# Patient Record
Sex: FEMALE | Race: BLACK OR AFRICAN AMERICAN | Employment: FULL TIME | URBAN - METROPOLITAN AREA
[De-identification: names, ages, dates, MRNs, and addresses within clinical notes are randomized per-mention and may not be internally consistent; named-entity substitution may affect disease eponyms.]

---

## 2022-08-29 ENCOUNTER — HOSPITAL ENCOUNTER (OUTPATIENT)
Dept: WOUND CARE | Age: 52
Discharge: HOME | End: 2022-08-29
Payer: COMMERCIAL

## 2022-08-29 PROCEDURE — 99203 OFFICE O/P NEW LOW 30 MIN: CPT | Performed by: PODIATRIST

## 2022-08-29 PROCEDURE — 99213 OFFICE O/P EST LOW 20 MIN: CPT

## 2022-08-29 PROCEDURE — 97597 DBRDMT OPN WND 1ST 20 CM/<: CPT

## 2022-08-29 PROCEDURE — 97597 DBRDMT OPN WND 1ST 20 CM/<: CPT | Performed by: PODIATRIST

## 2022-08-29 RX ORDER — CEPHALEXIN 500 MG/1
500 CAPSULE ORAL 3 TIMES DAILY
COMMUNITY
End: 2022-09-01

## 2022-08-29 NOTE — DISCHARGE INSTRUCTIONS
[facility]   Physician Orders and Discharge Instructions  [facility]  [address] [facility]  Telephone: [phone]      NAME:  Keven De Oliveira          YOB: 1970  MEDICAL RECORD NUMBER:  [id number]    Your  is:  Saul Rodríguez Care/Facility: none     Wound Location: Left Lateral Leg     Dressing orders: 1. Cleanse wound(s) with normal saline. 2. Apply dry MINO  Or equivalent to wound bed. 3. Moisten MINO with a few drops of normal saline. 4. Cover with bordered gauze  5. Change  Every other day or Monday, Wednesday, and Friday      Compression: none     Offloading Device: none     Other Instructions: Apply warm compresses to right leg daily for 10-15 minutes. Use ace wrap to hold in place. Keep all dressings clean, dry and intact. Keep pressure off the wound(s) at all times. Follow up visit   3 Weeks September 19, 2022 at     Please give 24 hour notice if unable to keep appointment. [phone]    If you experience any of the following, please call the Wound Care Service at  [phone] or go to the nearest emergency room. *Increase in pain *Temperature over 101 *Increase in drainage from your wound or a foul odor  *Uncontrolled swelling *Need for compression bandage changes due to slippage, breakthrough drainage       PLEASE NOTE: IF YOU ARE UNABLE TO OBTAIN WOUND SUPPLIES, CONTINUE TO USE THE SUPPLIES YOU HAVE AVAILABLE UNTIL YOU ARE ABLE TO REACH US.  IT IS MOST IMPORTANT TO KEEP THE WOUND COVERED AT ALL TIMES           Electronically signed by [place] Encinas DPM on 8/29/2022 at 3:16 PM

## 2022-08-29 NOTE — DISCHARGE INSTR - COC
Continuity of Care Form    Patient Name: Marco Antonio Figueroa   :  1970  MRN:  [id number]    Admit date:  2022  Discharge date:  ***    Code Status Order: No Order   Advance Directives:     Admitting Physician:  No admitting provider for patient encounter. PCP: Juan Hendrickson MD    Discharging Nurse: [facility] Unit/Room#: No information available for this encounter. Discharging Unit Phone Number: ***    Emergency Contact:   Extended Emergency Contact Information  Primary Emergency Contact: CherrieSantana   [address] Phone: [phone]  Relation: Other    Past Surgical History:  Past Surgical History:   Procedure Laterality Date     SECTION      COLON SURGERY         Immunization History: There is no immunization history on file for this patient. Active Problems: There is no problem list on file for this patient. Isolation/Infection:   Isolation            No Isolation          Patient Infection Status       None to display            Nurse Assessment:  Last Vital Signs: There were no vitals taken for this visit.     Last documented pain score (0-10 scale):    Last Weight:   Wt Readings from Last 1 Encounters:   11/06/15 238 lb 6.4 oz (108.1 kg)     Mental Status:  {IP PT MENTAL STATUS:42722}    IV Access:  { JOE IV ACCESS:321784758}    Nursing Mobility/ADLs:  Walking   {CHP DME CSQQ:197416801}  Transfer  {CHP DME MOEP:186752193}  Bathing  {CHP DME ZTRQ:518080873}  Dressing  {CHP DME IWMH:806491433}  Toileting  {P DME RGIX:578407770}  Feeding  {P DME TWKJ:292417194}  Med Admin  {P DME QFPB:803461198}  Med Delivery   { JOE MED Delivery:059819567}    Wound Care Documentation and Therapy:  Wound 22 Leg Left;Lateral #1 (Active)   Wound Image   22 1449   Wound Etiology Other 22 1449   Wound Length (cm) 1 cm 22 1449   Wound Width (cm) 0.8 cm 22 1449   Wound Depth (cm) 0.1 cm 22 1449   Wound Surface Area (cm^2) 0.8 cm^2 22 1449   Wound Volume (cm^3) 0.08 cm^3 22 1449   Wound Assessment Eschar dry 22 1449   Drainage Amount None 22 1449   Odor None 22 1449   Margins Defined edges 22 1449   Number of days: 0        Elimination:  Continence: Bowel: {YES / UZ:50931}  Bladder: {YES / IW:76561}  Urinary Catheter: {Urinary Catheter:141531295}   Colostomy/Ileostomy/Ileal Conduit: {YES / YV:29124}       Date of Last BM: ***  No intake or output data in the 24 hours ending 22 1501  No intake/output data recorded.     Safety Concerns:     [address] Safety Concerns:674589102}    Impairments/Disabilities:      [address] Impairments/Disabilities:629908842}    Nutrition Therapy:  Current Nutrition Therapy:   [address] Diet List:108245959}    Routes of Feeding: {[facility] DME Other Feedings:096749684}  Liquids: {Slp liquid thickness:87547}  Daily Fluid Restriction: {CHP DME Yes amt example:816923054}  Last Modified Barium Swallow with Video (Video Swallowing Test): {Done Not Done QMYS:181043805}    Treatments at the Time of Hospital Discharge:   Respiratory Treatments: ***  Oxygen Therapy:  {Therapy; copd oxygen:85291}  Ventilator:    { CC Vent VCQW:949577436}    Rehab Therapies: {THERAPEUTIC INTERVENTION:8410084757}  Weight Bearing Status/Restrictions: [address]  Weight Bearin}  Other Medical Equipment (for information only, NOT a DME order):  {EQUIPMENT:889015254}  Other Treatments: ***    Patient's personal belongings (please select all that are sent with patient):  {[facility] DME Belongings:261583699}    RN SIGNATURE:  {Esignature:350385228}    CASE MANAGEMENT/SOCIAL WORK SECTION    Inpatient Status Date: ***    Readmission Risk Assessment Score:  Readmission Risk              Risk of Unplanned Readmission:  0           Discharging to Facility/ Agency   Name:   Address:  Phone:  Fax:    Dialysis Facility (if applicable)   Name:  Address:  Dialysis Schedule:  Phone:  Fax:    / signature: {Esignature:689943468}    PHYSICIAN SECTION    Prognosis: {Prognosis:1274799994}    Condition at Discharge: Natalie8 Laura Tristan Patient Condition:849845303}    Rehab Potential (if transferring to Rehab): {Prognosis:9630517039}    Recommended Labs or Other Treatments After Discharge: ***    Physician Certification: I certify the above information and transfer of Ena Killian  is necessary for the continuing treatment of the diagnosis listed and that she requires {Admit to Appropriate Level of Care:62315} for {GREATER/LESS:888108955} 30 days.      Update Admission H&P: {CHP DME Changes in UGDNP:382741011}    PHYSICIAN SIGNATURE:  {Esignature:956882243}
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
## 2022-08-29 NOTE — PLAN OF CARE
Problem: Cognitive:  Goal: Knowledge of wound care  Description: Knowledge of wound care  Outcome: Progressing  Goal: Understands risk factors for wounds  Description: Understands risk factors for wounds  Outcome: Progressing     Problem: Wound:  Goal: Will show signs of wound healing; wound closure and no evidence of infection  Description: Will show signs of wound healing; wound closure and no evidence of infection  Outcome: Progressing     Problem: Smoking cessation:  Goal: Ability to formulate a plan to maintain a tobacco-free life will be supported  Description: Ability to formulate a plan to maintain a tobacco-free life will be supported  Outcome: Progressing     Problem: Compression therapy:  Goal: Will be free from complications associated with compression therapy  Description: Will be free from complications associated with compression therapy  Outcome: Progressing     Problem: Weight control:  Goal: Ability to maintain an optimal weight for height and age will be supported  Description: Ability to maintain an optimal weight for height and age will be supported  Outcome: Progressing

## 2022-11-02 ENCOUNTER — TELEPHONE (OUTPATIENT)
Dept: WOUND CARE | Age: 52
End: 2022-11-02

## 2022-11-02 NOTE — TELEPHONE ENCOUNTER
Spoke with patient who states her wound is healed, will be discharged from [facility].

## 2023-09-18 DIAGNOSIS — R19.00 MASS OF PELVIS: ICD-10-CM

## 2023-09-18 DIAGNOSIS — C18.7 MALIGNANT NEOPLASM OF SIGMOID COLON (MULTI): Primary | ICD-10-CM

## 2023-09-26 DIAGNOSIS — R19.00 PELVIC MASS IN FEMALE: ICD-10-CM

## 2023-09-26 DIAGNOSIS — C18.7 MALIGNANT NEOPLASM OF SIGMOID COLON (MULTI): Primary | ICD-10-CM

## 2023-11-06 ENCOUNTER — APPOINTMENT (OUTPATIENT)
Dept: GYNECOLOGIC ONCOLOGY | Facility: CLINIC | Age: 53
End: 2023-11-06
Payer: COMMERCIAL

## 2023-11-10 PROBLEM — L98.9 LEG SKIN LESION, LEFT: Status: ACTIVE | Noted: 2023-11-10

## 2023-11-10 PROBLEM — F41.9 ANXIETY DISORDER: Status: ACTIVE | Noted: 2023-11-10

## 2023-11-10 PROBLEM — R94.31 ABNORMAL EKG: Status: ACTIVE | Noted: 2023-11-10

## 2023-11-10 PROBLEM — R74.8 ELEVATED LIVER ENZYMES: Status: ACTIVE | Noted: 2023-11-10

## 2023-11-10 PROBLEM — R00.0 TACHYCARDIA: Status: ACTIVE | Noted: 2023-11-10

## 2023-11-10 PROBLEM — R45.86 LABILE MOOD: Status: ACTIVE | Noted: 2023-11-10

## 2023-11-10 PROBLEM — R93.5 ABNORMAL CT OF THE ABDOMEN: Status: ACTIVE | Noted: 2023-11-10

## 2023-11-10 PROBLEM — C18.7 MALIGNANT NEOPLASM OF SIGMOID COLON (MULTI): Status: ACTIVE | Noted: 2023-11-10

## 2023-11-10 PROBLEM — I10 ESSENTIAL HYPERTENSION, BENIGN: Status: ACTIVE | Noted: 2023-11-10

## 2023-11-10 PROBLEM — R73.9 HYPERGLYCEMIA: Status: ACTIVE | Noted: 2023-11-10

## 2023-11-10 RX ORDER — CEPHALEXIN 500 MG/1
500 CAPSULE ORAL 3 TIMES DAILY
COMMUNITY
Start: 2022-08-13

## 2023-11-10 RX ORDER — CARVEDILOL 12.5 MG/1
1 TABLET ORAL 2 TIMES DAILY
COMMUNITY
Start: 2022-04-02

## 2023-11-10 RX ORDER — SERTRALINE HYDROCHLORIDE 50 MG/1
50 TABLET, FILM COATED ORAL DAILY
COMMUNITY
End: 2023-12-12

## 2023-11-10 RX ORDER — SEMAGLUTIDE 1.34 MG/ML
0.25 INJECTION, SOLUTION SUBCUTANEOUS
COMMUNITY
Start: 2022-08-13

## 2023-12-12 DIAGNOSIS — R45.86 EMOTIONAL LABILITY: ICD-10-CM

## 2023-12-12 RX ORDER — SERTRALINE HYDROCHLORIDE 50 MG/1
50 TABLET, FILM COATED ORAL DAILY
Qty: 90 TABLET | Refills: 1 | Status: SHIPPED | OUTPATIENT
Start: 2023-12-12

## 2024-09-24 ENCOUNTER — LAB (OUTPATIENT)
Dept: LAB | Facility: CLINIC | Age: 54
End: 2024-09-24
Payer: COMMERCIAL

## 2024-09-24 ENCOUNTER — OFFICE VISIT (OUTPATIENT)
Dept: HEMATOLOGY/ONCOLOGY | Facility: CLINIC | Age: 54
End: 2024-09-24
Payer: COMMERCIAL

## 2024-09-24 ENCOUNTER — TELEPHONE (OUTPATIENT)
Dept: GASTROENTEROLOGY | Facility: CLINIC | Age: 54
End: 2024-09-24
Payer: COMMERCIAL

## 2024-09-24 VITALS
WEIGHT: 236.33 LBS | BODY MASS INDEX: 40.5 KG/M2 | HEART RATE: 93 BPM | OXYGEN SATURATION: 98 % | SYSTOLIC BLOOD PRESSURE: 170 MMHG | DIASTOLIC BLOOD PRESSURE: 101 MMHG | TEMPERATURE: 96.8 F | RESPIRATION RATE: 16 BRPM

## 2024-09-24 DIAGNOSIS — F41.9 ANXIETY DISORDER, UNSPECIFIED TYPE: ICD-10-CM

## 2024-09-24 DIAGNOSIS — E66.01 CLASS 3 SEVERE OBESITY WITH BODY MASS INDEX (BMI) OF 40.0 TO 44.9 IN ADULT, UNSPECIFIED OBESITY TYPE, UNSPECIFIED WHETHER SERIOUS COMORBIDITY PRESENT: ICD-10-CM

## 2024-09-24 DIAGNOSIS — C18.7 MALIGNANT NEOPLASM OF SIGMOID COLON (MULTI): Primary | ICD-10-CM

## 2024-09-24 DIAGNOSIS — C18.7 MALIGNANT NEOPLASM OF SIGMOID COLON (MULTI): ICD-10-CM

## 2024-09-24 DIAGNOSIS — I10 ESSENTIAL HYPERTENSION, BENIGN: ICD-10-CM

## 2024-09-24 LAB
ALBUMIN SERPL BCP-MCNC: 4.1 G/DL (ref 3.4–5)
ALP SERPL-CCNC: 113 U/L (ref 33–110)
ALT SERPL W P-5'-P-CCNC: 13 U/L (ref 7–45)
ANION GAP SERPL CALC-SCNC: 13 MMOL/L (ref 10–20)
AST SERPL W P-5'-P-CCNC: 19 U/L (ref 9–39)
BASOPHILS # BLD AUTO: 0.06 X10*3/UL (ref 0–0.1)
BASOPHILS NFR BLD AUTO: 1 %
BILIRUB SERPL-MCNC: 0.5 MG/DL (ref 0–1.2)
BUN SERPL-MCNC: 20 MG/DL (ref 6–23)
CALCIUM SERPL-MCNC: 9.7 MG/DL (ref 8.6–10.3)
CEA SERPL-MCNC: 0.6 UG/L
CHLORIDE SERPL-SCNC: 102 MMOL/L (ref 98–107)
CO2 SERPL-SCNC: 28 MMOL/L (ref 21–32)
CREAT SERPL-MCNC: 0.73 MG/DL (ref 0.5–1.05)
EGFRCR SERPLBLD CKD-EPI 2021: >90 ML/MIN/1.73M*2
EOSINOPHIL # BLD AUTO: 0.17 X10*3/UL (ref 0–0.7)
EOSINOPHIL NFR BLD AUTO: 2.7 %
ERYTHROCYTE [DISTWIDTH] IN BLOOD BY AUTOMATED COUNT: 14.6 % (ref 11.5–14.5)
GLUCOSE SERPL-MCNC: 102 MG/DL (ref 74–99)
HCT VFR BLD AUTO: 44.1 % (ref 36–46)
HGB BLD-MCNC: 14 G/DL (ref 12–16)
IMM GRANULOCYTES # BLD AUTO: 0.01 X10*3/UL (ref 0–0.7)
IMM GRANULOCYTES NFR BLD AUTO: 0.2 % (ref 0–0.9)
LYMPHOCYTES # BLD AUTO: 1.85 X10*3/UL (ref 1.2–4.8)
LYMPHOCYTES NFR BLD AUTO: 29.6 %
MCH RBC QN AUTO: 29.4 PG (ref 26–34)
MCHC RBC AUTO-ENTMCNC: 31.7 G/DL (ref 32–36)
MCV RBC AUTO: 93 FL (ref 80–100)
MONOCYTES # BLD AUTO: 0.61 X10*3/UL (ref 0.1–1)
MONOCYTES NFR BLD AUTO: 9.7 %
NEUTROPHILS # BLD AUTO: 3.56 X10*3/UL (ref 1.2–7.7)
NEUTROPHILS NFR BLD AUTO: 56.8 %
PLATELET # BLD AUTO: 320 X10*3/UL (ref 150–450)
POTASSIUM SERPL-SCNC: 4 MMOL/L (ref 3.5–5.3)
PROT SERPL-MCNC: 8.3 G/DL (ref 6.4–8.2)
RBC # BLD AUTO: 4.77 X10*6/UL (ref 4–5.2)
SODIUM SERPL-SCNC: 139 MMOL/L (ref 136–145)
WBC # BLD AUTO: 6.3 X10*3/UL (ref 4.4–11.3)

## 2024-09-24 PROCEDURE — 80053 COMPREHEN METABOLIC PANEL: CPT

## 2024-09-24 PROCEDURE — 3077F SYST BP >= 140 MM HG: CPT | Performed by: INTERNAL MEDICINE

## 2024-09-24 PROCEDURE — 85025 COMPLETE CBC W/AUTO DIFF WBC: CPT

## 2024-09-24 PROCEDURE — 99214 OFFICE O/P EST MOD 30 MIN: CPT | Performed by: INTERNAL MEDICINE

## 2024-09-24 PROCEDURE — 36415 COLL VENOUS BLD VENIPUNCTURE: CPT

## 2024-09-24 PROCEDURE — 3080F DIAST BP >= 90 MM HG: CPT | Performed by: INTERNAL MEDICINE

## 2024-09-24 PROCEDURE — 82378 CARCINOEMBRYONIC ANTIGEN: CPT

## 2024-09-24 RX ORDER — CARVEDILOL 12.5 MG/1
12.5 TABLET ORAL 2 TIMES DAILY
Qty: 60 TABLET | Refills: 0 | Status: SHIPPED | OUTPATIENT
Start: 2024-09-24

## 2024-09-24 RX ORDER — POLYETHYLENE GLYCOL 3350, SODIUM CHLORIDE, SODIUM BICARBONATE, POTASSIUM CHLORIDE 420; 11.2; 5.72; 1.48 G/4L; G/4L; G/4L; G/4L
4000 POWDER, FOR SOLUTION ORAL ONCE
Qty: 4000 ML | Refills: 0 | Status: SHIPPED | OUTPATIENT
Start: 2024-09-24 | End: 2024-09-24

## 2024-09-24 ASSESSMENT — PAIN SCALES - GENERAL: PAINLEVEL: 0-NO PAIN

## 2024-09-24 NOTE — PROGRESS NOTES
Had CRC in 2020, due for surveillance colonoscopy.  Order placed, please call to schedule.   Kal De Paz MD

## 2024-09-24 NOTE — PATIENT INSTRUCTIONS
You need to followup with your PCP as soon as possible regarding your blood pressure    See you again in 1 year    You are also way overdue for colonoscopy, which was supposed to have been done at the end of 2021

## 2024-09-28 PROBLEM — E66.813 CLASS 3 SEVERE OBESITY WITH BODY MASS INDEX (BMI) OF 40.0 TO 44.9 IN ADULT: Status: ACTIVE | Noted: 2024-09-28

## 2024-09-28 PROBLEM — E66.01 CLASS 3 SEVERE OBESITY WITH BODY MASS INDEX (BMI) OF 40.0 TO 44.9 IN ADULT: Status: ACTIVE | Noted: 2024-09-28

## 2024-09-28 ASSESSMENT — ENCOUNTER SYMPTOMS
CARDIOVASCULAR NEGATIVE: 1
MUSCULOSKELETAL NEGATIVE: 1
CONSTITUTIONAL NEGATIVE: 1
GASTROINTESTINAL NEGATIVE: 1
EYES NEGATIVE: 1
RESPIRATORY NEGATIVE: 1
ENDOCRINE NEGATIVE: 1

## 2024-10-16 ENCOUNTER — LAB (OUTPATIENT)
Dept: LAB | Facility: LAB | Age: 54
End: 2024-10-16
Payer: COMMERCIAL

## 2024-10-16 ENCOUNTER — APPOINTMENT (OUTPATIENT)
Dept: PRIMARY CARE | Facility: CLINIC | Age: 54
End: 2024-10-16
Payer: COMMERCIAL

## 2024-10-16 VITALS
SYSTOLIC BLOOD PRESSURE: 156 MMHG | RESPIRATION RATE: 16 BRPM | HEIGHT: 64 IN | HEART RATE: 74 BPM | DIASTOLIC BLOOD PRESSURE: 94 MMHG | WEIGHT: 239 LBS | OXYGEN SATURATION: 97 % | BODY MASS INDEX: 40.8 KG/M2 | TEMPERATURE: 97.5 F

## 2024-10-16 DIAGNOSIS — R73.9 HYPERGLYCEMIA: ICD-10-CM

## 2024-10-16 DIAGNOSIS — R06.83 SNORING: ICD-10-CM

## 2024-10-16 DIAGNOSIS — R45.86 EMOTIONAL LABILITY: ICD-10-CM

## 2024-10-16 DIAGNOSIS — F41.9 ANXIETY DISORDER, UNSPECIFIED TYPE: ICD-10-CM

## 2024-10-16 DIAGNOSIS — F51.01 PRIMARY INSOMNIA: ICD-10-CM

## 2024-10-16 DIAGNOSIS — C18.7 MALIGNANT NEOPLASM OF SIGMOID COLON (MULTI): ICD-10-CM

## 2024-10-16 DIAGNOSIS — Z00.00 PHYSICAL EXAM: Primary | ICD-10-CM

## 2024-10-16 DIAGNOSIS — I10 ESSENTIAL HYPERTENSION, BENIGN: ICD-10-CM

## 2024-10-16 DIAGNOSIS — Z12.31 ENCOUNTER FOR SCREENING MAMMOGRAM FOR MALIGNANT NEOPLASM OF BREAST: ICD-10-CM

## 2024-10-16 LAB
CHOLEST SERPL-MCNC: 239 MG/DL (ref 0–199)
CHOLESTEROL/HDL RATIO: 3.5
EST. AVERAGE GLUCOSE BLD GHB EST-MCNC: 105 MG/DL
HBA1C MFR BLD: 5.3 %
HDLC SERPL-MCNC: 67.6 MG/DL
LDLC SERPL CALC-MCNC: 152 MG/DL
NON HDL CHOLESTEROL: 171 MG/DL (ref 0–149)
T4 FREE SERPL-MCNC: 1.02 NG/DL (ref 0.61–1.12)
TRIGL SERPL-MCNC: 96 MG/DL (ref 0–149)
TSH SERPL-ACNC: 1.21 MIU/L (ref 0.44–3.98)
VLDL: 19 MG/DL (ref 0–40)

## 2024-10-16 PROCEDURE — 99214 OFFICE O/P EST MOD 30 MIN: CPT | Performed by: FAMILY MEDICINE

## 2024-10-16 PROCEDURE — 3008F BODY MASS INDEX DOCD: CPT | Performed by: FAMILY MEDICINE

## 2024-10-16 PROCEDURE — 3080F DIAST BP >= 90 MM HG: CPT | Performed by: FAMILY MEDICINE

## 2024-10-16 PROCEDURE — 80061 LIPID PANEL: CPT

## 2024-10-16 PROCEDURE — 84439 ASSAY OF FREE THYROXINE: CPT

## 2024-10-16 PROCEDURE — 84443 ASSAY THYROID STIM HORMONE: CPT

## 2024-10-16 PROCEDURE — 4004F PT TOBACCO SCREEN RCVD TLK: CPT | Performed by: FAMILY MEDICINE

## 2024-10-16 PROCEDURE — 3077F SYST BP >= 140 MM HG: CPT | Performed by: FAMILY MEDICINE

## 2024-10-16 PROCEDURE — 36415 COLL VENOUS BLD VENIPUNCTURE: CPT

## 2024-10-16 PROCEDURE — 83036 HEMOGLOBIN GLYCOSYLATED A1C: CPT

## 2024-10-16 RX ORDER — AMLODIPINE BESYLATE 2.5 MG/1
2.5 TABLET ORAL DAILY
Qty: 90 TABLET | Refills: 3 | Status: SHIPPED | OUTPATIENT
Start: 2024-10-16 | End: 2025-04-14

## 2024-10-16 RX ORDER — ZOLPIDEM TARTRATE 5 MG/1
5 TABLET ORAL NIGHTLY PRN
Qty: 28 TABLET | Refills: 0 | Status: SHIPPED | OUTPATIENT
Start: 2024-10-16 | End: 2024-12-15

## 2024-10-16 RX ORDER — SERTRALINE HYDROCHLORIDE 50 MG/1
50 TABLET, FILM COATED ORAL DAILY
Qty: 90 TABLET | Refills: 3 | Status: SHIPPED | OUTPATIENT
Start: 2024-10-16

## 2024-10-16 ASSESSMENT — PATIENT HEALTH QUESTIONNAIRE - PHQ9
7. TROUBLE CONCENTRATING ON THINGS, SUCH AS READING THE NEWSPAPER OR WATCHING TELEVISION: SEVERAL DAYS
2. FEELING DOWN, DEPRESSED OR HOPELESS: NEARLY EVERY DAY
SUM OF ALL RESPONSES TO PHQ9 QUESTIONS 1 AND 2: 6
SUM OF ALL RESPONSES TO PHQ QUESTIONS 1-9: 18
3. TROUBLE FALLING OR STAYING ASLEEP OR SLEEPING TOO MUCH: NEARLY EVERY DAY
6. FEELING BAD ABOUT YOURSELF - OR THAT YOU ARE A FAILURE OR HAVE LET YOURSELF OR YOUR FAMILY DOWN: MORE THAN HALF THE DAYS
8. MOVING OR SPEAKING SO SLOWLY THAT OTHER PEOPLE COULD HAVE NOTICED. OR THE OPPOSITE, BEING SO FIGETY OR RESTLESS THAT YOU HAVE BEEN MOVING AROUND A LOT MORE THAN USUAL: SEVERAL DAYS
5. POOR APPETITE OR OVEREATING: SEVERAL DAYS
4. FEELING TIRED OR HAVING LITTLE ENERGY: NEARLY EVERY DAY
9. THOUGHTS THAT YOU WOULD BE BETTER OFF DEAD, OR OF HURTING YOURSELF: SEVERAL DAYS
10. IF YOU CHECKED OFF ANY PROBLEMS, HOW DIFFICULT HAVE THESE PROBLEMS MADE IT FOR YOU TO DO YOUR WORK, TAKE CARE OF THINGS AT HOME, OR GET ALONG WITH OTHER PEOPLE: VERY DIFFICULT
1. LITTLE INTEREST OR PLEASURE IN DOING THINGS: NEARLY EVERY DAY

## 2024-10-16 NOTE — PROGRESS NOTES
Covid vax: declined  Flu: declined  Shingles: declined    CRC: 2020-[place]  Mammogram: 8/2021-ordered  Pap: 2020  Lmp: n/a

## 2024-10-16 NOTE — PROGRESS NOTES
"Subjective   Patient ID: Shruti Zurita is a 54 y.o. female who presents for Hypertension (Was off coreg x >6 months/Restarted 3 weeks ago), Anxiety, and Depression (Has been very depressed lately).  Covid vax: declined  Flu: declined  Shingles: declined     CRC: -CA  Mammogram: 2021-ordered  Pap:   Lmp: n/a  HPI  Patient Active Problem List   Diagnosis    Abnormal CT of the abdomen    Abnormal EKG    Anxiety disorder    Elevated liver enzymes    Essential hypertension, benign    Hyperglycemia    Labile mood    Leg skin lesion, left    Malignant neoplasm of sigmoid colon (Multi)    Tachycardia    Class 3 severe obesity with body mass index (BMI) of 40.0 to 44.9 in adult       Past Surgical History:   Procedure Laterality Date     SECTION, CLASSIC      ,     COLON SURGERY      [place]    COLONOSCOPY W/ POLYPECTOMY      INVASIVE, MODERATELY DIFFERENTIATED ADENOCARCINOMA (sigmoid)-repeat     FLEXIBLE SIGMOIDOSCOPY  2021    wnl     Has scope planned     Review of Systems  This patient has  NO history of seizures/ CAD or CVA    NO history of recent Covid nor flu symptoms,  NO Fever nor chills,  NO Chest pain, shortness of breath nor paroxysmal nocturnal dyspnea,  NO Nausea, vomiting, nor diarrhea,  NO Hematochezia nor melena,  NO Dysuria, hematuria, nor new incontinence issues  NO new severe headaches nor neurological complaints,  NO new issues with anxiety nor depression nor new psychiatric complaints,  NO suicidal nor homicidal ideations.   Has not reliably followed with heme onc or GI  Is AWARE needs to  Also had been offered GYN for abn pelvic ct    OBJECTIVE:  BP (!) 156/94   Pulse 74   Temp 36.4 °C (97.5 °F) (Temporal)   Resp 16   Ht 1.626 m (5' 4\")   Wt 108 kg (239 lb)   SpO2 97%   BMI 41.02 kg/m²      General:  alert, oriented, no acute distress.  No obvious skin rashes noted.   No gait disturbance noted.    Mood is pleasant,  no signs of emotional distress.   Not " appearing intoxicated or altered.   No voiced delusions,   Normal, appropriate behavior.    HEENT: Normocephalic, atraumatic,   Pupils round, reactive to light  Extraocular motions intact and wnl  Tympanic membranes normal    Neck: no nuchal rigidity  No masses palpable.  No carotid bruits.  No thyromegaly.    Respiratory: Equal breath sounds  No wheezes,    rales,    nor rhonchi  No respiratory distress.    Heart: Regular rate and rhythm, no    murmurs  no rubs/gallops    Abdomen: no masses palpable, nontender, no rebound nor guarding.  Ovwt    Extremities: NO cyanosis noted, no clubbing.   No edema noted.  2+dorsalis pedis pulses.    Normal-not antalgic, steady gait.    Lab on 09/24/2024   Component Date Value Ref Range Status    WBC 09/24/2024 6.3  4.4 - 11.3 x10*3/uL Final    RBC 09/24/2024 4.77  4.00 - 5.20 x10*6/uL Final    Hemoglobin 09/24/2024 14.0  12.0 - 16.0 g/dL Final    Hematocrit 09/24/2024 44.1  36.0 - 46.0 % Final    MCV 09/24/2024 93  80 - 100 fL Final    MCH 09/24/2024 29.4  26.0 - 34.0 pg Final    MCHC 09/24/2024 31.7 (L)  32.0 - 36.0 g/dL Final    RDW 09/24/2024 14.6 (H)  11.5 - 14.5 % Final    Platelets 09/24/2024 320  150 - 450 x10*3/uL Final    Neutrophils % 09/24/2024 56.8  40.0 - 80.0 % Final    Immature Granulocytes %, Automated 09/24/2024 0.2  0.0 - 0.9 % Final    Immature Granulocyte Count (IG) includes promyelocytes, myelocytes and metamyelocytes but does not include bands. Percent differential counts (%) should be interpreted in the context of the absolute cell counts (cells/UL).    Lymphocytes % 09/24/2024 29.6  13.0 - 44.0 % Final    Monocytes % 09/24/2024 9.7  2.0 - 10.0 % Final    Eosinophils % 09/24/2024 2.7  0.0 - 6.0 % Final    Basophils % 09/24/2024 1.0  0.0 - 2.0 % Final    Neutrophils Absolute 09/24/2024 3.56  1.20 - 7.70 x10*3/uL Final    Percent differential counts (%) should be interpreted in the context of the absolute cell counts (cells/uL).    Immature Granulocytes  Absolute, Au* 09/24/2024 0.01  0.00 - 0.70 x10*3/uL Final    Lymphocytes Absolute 09/24/2024 1.85  1.20 - 4.80 x10*3/uL Final    Monocytes Absolute 09/24/2024 0.61  0.10 - 1.00 x10*3/uL Final    Eosinophils Absolute 09/24/2024 0.17  0.00 - 0.70 x10*3/uL Final    Basophils Absolute 09/24/2024 0.06  0.00 - 0.10 x10*3/uL Final    Carcinoembryonic AG 09/24/2024 0.6  ug/L Final    Glucose 09/24/2024 102 (H)  74 - 99 mg/dL Final    Sodium 09/24/2024 139  136 - 145 mmol/L Final    Potassium 09/24/2024 4.0  3.5 - 5.3 mmol/L Final    Chloride 09/24/2024 102  98 - 107 mmol/L Final    Bicarbonate 09/24/2024 28  21 - 32 mmol/L Final    Anion Gap 09/24/2024 13  10 - 20 mmol/L Final    Urea Nitrogen 09/24/2024 20  6 - 23 mg/dL Final    Creatinine 09/24/2024 0.73  0.50 - 1.05 mg/dL Final    eGFR 09/24/2024 >90  >60 mL/min/1.73m*2 Final    Calculations of estimated GFR are performed using the 2021 CKD-EPI Study Refit equation without the race variable for the IDMS-Traceable creatinine methods.  https://jasn.asnjournals.org/content/early/2021/09/22/ASN.9787442029    Calcium 09/24/2024 9.7  8.6 - 10.3 mg/dL Final    Albumin 09/24/2024 4.1  3.4 - 5.0 g/dL Final    Alkaline Phosphatase 09/24/2024 113 (H)  33 - 110 U/L Final    Total Protein 09/24/2024 8.3 (H)  6.4 - 8.2 g/dL Final    AST 09/24/2024 19  9 - 39 U/L Final    Bilirubin, Total 09/24/2024 0.5  0.0 - 1.2 mg/dL Final    ALT 09/24/2024 13  7 - 45 U/L Final    Patients treated with Sulfasalazine may generate falsely decreased results for ALT.        Assessment/Plan     Problem List Items Addressed This Visit       Anxiety disorder    Relevant Orders    Thyroid Stimulating Hormone    Thyroxine, Free    Lipid Panel    Hemoglobin A1C    Essential hypertension, benign    Relevant Medications    amLODIPine (Norvasc) 2.5 mg tablet    Other Relevant Orders    Thyroid Stimulating Hormone    Thyroxine, Free    Lipid Panel    Hemoglobin A1C    Hyperglycemia    Relevant Orders    Thyroid  Stimulating Hormone    Thyroxine, Free    Lipid Panel    Hemoglobin A1C    Malignant neoplasm of sigmoid colon (Multi)    Relevant Orders    Thyroid Stimulating Hormone    Thyroxine, Free    Lipid Panel    Hemoglobin A1C     Other Visit Diagnoses       Physical exam    -  Primary    Relevant Orders    CT cardiac scoring wo IV contrast    Encounter for screening mammogram for malignant neoplasm of breast        Relevant Orders    BI mammo bilateral screening tomosynthesis    Thyroid Stimulating Hormone    Thyroxine, Free    Lipid Panel    Hemoglobin A1C    Emotional lability        Relevant Medications    sertraline (Zoloft) 50 mg tablet    Primary insomnia        Relevant Medications    zolpidem (Ambien) 5 mg tablet            Follow up at next scheduled visit -as planned  Add amlodipine 2.5 This medications risks, benefits, and alternatives were discussed with patient at length.  If any unwanted side effects occur-discontinue medicine and call the office for discussion.  Some insomnia  And stress  Add ambien for a few wks  This medications risks, benefits, and alternatives were discussed with patient at length.  If any unwanted side effects occur-discontinue medicine and call the office for discussion.  Does report snoring unclear if any an=pnea  Risks of ambien addressed-zara if apnea  This medications risks, benefits, and alternatives were discussed with patient at length.  If any unwanted side effects occur-discontinue medicine and call the office for discussion.  Resume zoloft  No hi/si  Has counselor at work  Is grieving siblings loss    Overuse and abuse potential discussed with patient (parents if applicable)  If mood deterioration, status change etc on medicine, suicidal or homicidal ideations -patient agrees to proceed with crisis plan-in place-including-not limited to contacting family, our office and or proceeding to ER.  Reviewed controlled substance agreement - including but not limited to the  risks-benefits-alternatives to treatment with a controlled substance medication(s).  It is recommended that [place]ARRS reports be checked and patient have appointment at least every 3months(6 for certain medicines only)  If the agreement signed (controlled substance agreement) is violated prescriptions may be stopped abruptly and patient /family understands and agrees to this.  Another reason for termination of agreement is if we have concern for abuse or overuse and it is also recommended that patient take responsibility to try to taper and minimize use of these medicines frequently trying to limit or gradually taper to discontinuation.    Patient is aware that side effects such as insomnia, unexpected weight changes are unexpected and should result in discontinuation.  Always use caution and AVOID operating machinery(driving etc) on pain medicines or CNS depressants and avoid combining together OR with alcohol. If opioids are prescribed patient understands the benefits of narcan and was offered prescription.  Follow up sooner if condition deteriorates or problems arise.  Reviewed controlled substances agreement including but not limited to the benefits-risks and alternatives to treatment with a controlled substance medication.    Agrees to regular follow and counseling for maximum benefits.  Cut down on etoh  No etoh w ambien or driving etc    6-8wks bp check  Ct cardiac sibling had CAD and  recently

## 2024-10-25 ENCOUNTER — HOSPITAL ENCOUNTER (OUTPATIENT)
Dept: RADIOLOGY | Facility: HOSPITAL | Age: 54
Discharge: HOME | End: 2024-10-25
Payer: COMMERCIAL

## 2024-10-25 DIAGNOSIS — Z12.31 ENCOUNTER FOR SCREENING MAMMOGRAM FOR MALIGNANT NEOPLASM OF BREAST: ICD-10-CM

## 2024-10-25 PROCEDURE — 77067 SCR MAMMO BI INCL CAD: CPT

## 2024-11-01 DIAGNOSIS — R92.8 ABNORMAL MAMMOGRAM: ICD-10-CM

## 2024-11-06 ENCOUNTER — ANESTHESIA EVENT (OUTPATIENT)
Dept: GASTROENTEROLOGY | Facility: EXTERNAL LOCATION | Age: 54
End: 2024-11-06

## 2024-11-15 ENCOUNTER — APPOINTMENT (OUTPATIENT)
Dept: GASTROENTEROLOGY | Facility: EXTERNAL LOCATION | Age: 54
End: 2024-11-15
Payer: COMMERCIAL

## 2024-11-15 ENCOUNTER — ANESTHESIA (OUTPATIENT)
Dept: GASTROENTEROLOGY | Facility: EXTERNAL LOCATION | Age: 54
End: 2024-11-15

## 2024-11-15 VITALS
BODY MASS INDEX: 40.8 KG/M2 | TEMPERATURE: 97.9 F | HEIGHT: 64 IN | DIASTOLIC BLOOD PRESSURE: 80 MMHG | WEIGHT: 239 LBS | OXYGEN SATURATION: 98 % | HEART RATE: 92 BPM | SYSTOLIC BLOOD PRESSURE: 126 MMHG | RESPIRATION RATE: 21 BRPM

## 2024-11-15 DIAGNOSIS — C18.7 MALIGNANT NEOPLASM OF SIGMOID COLON (MULTI): ICD-10-CM

## 2024-11-15 PROCEDURE — 45385 COLONOSCOPY W/LESION REMOVAL: CPT | Performed by: INTERNAL MEDICINE

## 2024-11-15 RX ORDER — ONDANSETRON HYDROCHLORIDE 2 MG/ML
4 INJECTION, SOLUTION INTRAVENOUS ONCE AS NEEDED
Status: DISCONTINUED | OUTPATIENT
Start: 2024-11-15 | End: 2024-11-16 | Stop reason: HOSPADM

## 2024-11-15 RX ORDER — PROPOFOL 10 MG/ML
INJECTION, EMULSION INTRAVENOUS AS NEEDED
Status: DISCONTINUED | OUTPATIENT
Start: 2024-11-15 | End: 2024-11-15

## 2024-11-15 RX ORDER — LIDOCAINE HYDROCHLORIDE 20 MG/ML
INJECTION, SOLUTION INFILTRATION; PERINEURAL AS NEEDED
Status: DISCONTINUED | OUTPATIENT
Start: 2024-11-15 | End: 2024-11-15

## 2024-11-15 RX ORDER — SODIUM CHLORIDE 9 MG/ML
INJECTION, SOLUTION INTRAVENOUS CONTINUOUS PRN
Status: DISCONTINUED | OUTPATIENT
Start: 2024-11-15 | End: 2024-11-15

## 2024-11-15 SDOH — HEALTH STABILITY: MENTAL HEALTH: CURRENT SMOKER: 0

## 2024-11-15 ASSESSMENT — PAIN - FUNCTIONAL ASSESSMENT
PAIN_FUNCTIONAL_ASSESSMENT: 0-10

## 2024-11-15 ASSESSMENT — PAIN SCALES - GENERAL
PAINLEVEL_OUTOF10: 0 - NO PAIN
PAINLEVEL_OUTOF10: 0 - NO PAIN
PAIN_LEVEL: 0
PAINLEVEL_OUTOF10: 0 - NO PAIN
PAINLEVEL_OUTOF10: 0 - NO PAIN

## 2024-11-15 ASSESSMENT — COLUMBIA-SUICIDE SEVERITY RATING SCALE - C-SSRS
1. IN THE PAST MONTH, HAVE YOU WISHED YOU WERE DEAD OR WISHED YOU COULD GO TO SLEEP AND NOT WAKE UP?: NO
6. HAVE YOU EVER DONE ANYTHING, STARTED TO DO ANYTHING, OR PREPARED TO DO ANYTHING TO END YOUR LIFE?: NO
2. HAVE YOU ACTUALLY HAD ANY THOUGHTS OF KILLING YOURSELF?: NO

## 2024-11-15 NOTE — DISCHARGE INSTRUCTIONS

## 2024-11-15 NOTE — ANESTHESIA PREPROCEDURE EVALUATION
Patient: Shruti Zurita    Procedure Information       Date/Time: 11/15/24 0830    Scheduled providers: Kal De Paz MD; Denisse Poon RN    Procedure: COLONOSCOPY    Location: [facility]            Relevant Problems   Cardiac   (+) Abnormal EKG   (+) Essential hypertension, benign      Neuro   (+) Anxiety disorder      GI   (+) Malignant neoplasm of sigmoid colon (Multi)      Liver   (+) Malignant neoplasm of sigmoid colon (Multi)      Endocrine   (+) Class 3 severe obesity with body mass index (BMI) of 40.0 to 44.9 in adult       Clinical information reviewed:                   NPO Detail:  No data recorded     Physical Exam    Airway  Mallampati: II  TM distance: >3 FB  Neck ROM: full     Cardiovascular - normal exam     Dental - normal exam     Pulmonary - normal exam  Breath sounds clear to auscultation     Abdominal            Anesthesia Plan    History of general anesthesia?: yes  History of complications of general anesthesia?: no    ASA 3     MAC     The patient is not a current smoker.    intravenous induction   Anesthetic plan and risks discussed with patient.    Plan discussed with CRNA.

## 2024-11-15 NOTE — ANESTHESIA POSTPROCEDURE EVALUATION
Patient: Shruti Zurita    Procedure Summary       Date: 11/15/24 Room / Location: [facility]    Anesthesia Start: 0835 Anesthesia Stop:     Procedure: COLONOSCOPY Diagnosis: Malignant neoplasm of sigmoid colon (Multi)    Scheduled Providers: Kal De Paz MD; Denisse Poon RN Responsible Provider: ZACK Lopez    Anesthesia Type: MAC ASA Status: 3            Anesthesia Type: MAC    Vitals Value Taken Time   /81 11/15/24 0854   Temp 36.6 11/15/24 0854   Pulse 95 11/15/24 0854   Resp 17 11/15/24 0854   SpO2 99 11/15/24 0854       Anesthesia Post Evaluation    Patient location during evaluation: bedside  Patient participation: complete - patient cannot participate  Level of consciousness: awake and responsive to verbal stimuli  Pain score: 0  Pain management: adequate  Airway patency: patent  Cardiovascular status: acceptable and hemodynamically stable  Respiratory status: acceptable  Hydration status: acceptable  Postoperative Nausea and Vomiting: none      No notable events documented.

## 2024-11-15 NOTE — H&P
Outpatient Hospital Procedure H&P    Patient Profile-Procedures  Initial Info  Patient Demographics  Name Shruti Zurita  Date of Birth 1970  MRN [id number]  Address   [address] [place] [id number] [place] [address]    Primary Phone Number [phone]  Secondary Phone Number    PCP Jo William    Procedure(s):  Colonoscopy    Primary contact name and number   Extended Emergency Contact Information  Primary Emergency Contact: Tres Ahumada  Home Phone: [phone]  Mobile Phone: [phone]  Relation: Significant Other    General Health  Weight There were no vitals filed for this visit.  BMI There is no height or weight on file to calculate BMI.    Allergies  No Known Allergies    Past Medical History   Past Medical History:   Diagnosis Date    Colon cancer (Multi)     History of mammogram 08/2021    cat 2    Pap test, as part of routine gynecological examination 02/2020    wnl, hpv neg       Provider assessment  Diagnosis: h/o CRC    Medication Reviewed - yes  Prior to Admission medications    Medication Sig Start Date End Date Taking? Authorizing Provider   amLODIPine (Norvasc) 2.5 mg tablet Take 1 tablet (2.5 mg) by mouth once daily. 10/16/24 4/14/25 Yes Jo William MD   carvedilol (Coreg) 12.5 mg tablet Take 1 tablet (12.5 mg) by mouth 2 times a day. 9/24/24  Yes Derek Ramos MD   rosuvastatin (Crestor) 5 mg tablet Take 1 tablet (5 mg) by mouth once daily. 10/18/24  Yes Jo William MD   sertraline (Zoloft) 50 mg tablet Take 1 tablet (50 mg) by mouth once daily. 10/16/24  Yes Jo William MD   zolpidem (Ambien) 5 mg tablet Take 1 tablet (5 mg) by mouth as needed at bedtime for sleep. 10/16/24 12/15/24 Yes Jo William MD       Physical Exam  There were no vitals filed for this visit.     General: A&Ox3, NAD.  CV: RRR. No murmur.  Resp: CTA bilaterally. No wheezing, rhonchi or rales.   Extrem: No edema.       Oropharyngeal  Classification II (hard and soft palate, upper portion of tonsils and uvula visible)  ASA PS Classification 3  Sedation Plan Deep  Procedure Plan - pre-procedural (re)assesment completed by physician:  discharge/transfer patient when discharge criteria met    Kal De Paz MD  11/15/2024 8:31 AM

## 2024-11-29 LAB
LABORATORY COMMENT REPORT: NORMAL
PATH REPORT.FINAL DX SPEC: NORMAL
PATH REPORT.GROSS SPEC: NORMAL
PATH REPORT.RELEVANT HX SPEC: NORMAL
PATH REPORT.TOTAL CANCER: NORMAL

## 2024-12-18 ENCOUNTER — APPOINTMENT (OUTPATIENT)
Dept: PRIMARY CARE | Facility: CLINIC | Age: 54
End: 2024-12-18
Payer: COMMERCIAL

## 2025-01-17 ENCOUNTER — HOSPITAL ENCOUNTER (EMERGENCY)
Age: 55
Discharge: HOME | End: 2025-01-17
Payer: COMMERCIAL

## 2025-01-17 VITALS
BODY MASS INDEX: 41.98 KG/M2 | WEIGHT: 252.3 LBS | RESPIRATION RATE: 20 BRPM | OXYGEN SATURATION: 98 % | TEMPERATURE: 97 F | DIASTOLIC BLOOD PRESSURE: 75 MMHG | HEART RATE: 102 BPM | SYSTOLIC BLOOD PRESSURE: 148 MMHG

## 2025-01-17 DIAGNOSIS — F10.920 ACUTE ALCOHOLIC INTOXICATION WITHOUT COMPLICATION (HCC): Primary | ICD-10-CM

## 2025-01-17 PROCEDURE — 99283 EMERGENCY DEPT VISIT LOW MDM: CPT
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
## 2025-02-14 ENCOUNTER — HOSPITAL ENCOUNTER (OUTPATIENT)
Dept: RADIOLOGY | Facility: CLINIC | Age: 55
Discharge: HOME | End: 2025-02-14
Payer: COMMERCIAL

## 2025-02-14 DIAGNOSIS — Z00.00 PHYSICAL EXAM: ICD-10-CM

## 2025-02-14 PROCEDURE — 75571 CT HRT W/O DYE W/CA TEST: CPT

## 2025-02-26 DIAGNOSIS — R93.1 ELEVATED CORONARY ARTERY CALCIUM SCORE: Primary | ICD-10-CM

## 2025-03-06 ENCOUNTER — APPOINTMENT (OUTPATIENT)
Dept: SLEEP MEDICINE | Facility: HOSPITAL | Age: 55
End: 2025-03-06
Payer: COMMERCIAL

## 2025-03-13 ENCOUNTER — APPOINTMENT (OUTPATIENT)
Dept: CARDIOLOGY | Facility: CLINIC | Age: 55
End: 2025-03-13
Payer: COMMERCIAL

## 2025-03-13 VITALS
DIASTOLIC BLOOD PRESSURE: 84 MMHG | HEART RATE: 65 BPM | BODY MASS INDEX: 39.4 KG/M2 | WEIGHT: 230.8 LBS | SYSTOLIC BLOOD PRESSURE: 130 MMHG | HEIGHT: 64 IN

## 2025-03-13 DIAGNOSIS — R93.1 ELEVATED CORONARY ARTERY CALCIUM SCORE: ICD-10-CM

## 2025-03-13 DIAGNOSIS — I10 PRIMARY HYPERTENSION: ICD-10-CM

## 2025-03-13 DIAGNOSIS — E78.2 MIXED HYPERLIPIDEMIA: ICD-10-CM

## 2025-03-13 DIAGNOSIS — R94.31 ABNORMAL EKG: ICD-10-CM

## 2025-03-13 PROCEDURE — 3075F SYST BP GE 130 - 139MM HG: CPT | Performed by: INTERNAL MEDICINE

## 2025-03-13 PROCEDURE — 99204 OFFICE O/P NEW MOD 45 MIN: CPT | Performed by: INTERNAL MEDICINE

## 2025-03-13 PROCEDURE — 93000 ELECTROCARDIOGRAM COMPLETE: CPT | Performed by: INTERNAL MEDICINE

## 2025-03-13 PROCEDURE — 3079F DIAST BP 80-89 MM HG: CPT | Performed by: INTERNAL MEDICINE

## 2025-03-13 PROCEDURE — 3008F BODY MASS INDEX DOCD: CPT | Performed by: INTERNAL MEDICINE

## 2025-03-13 RX ORDER — ROSUVASTATIN CALCIUM 20 MG/1
20 TABLET, COATED ORAL DAILY
Qty: 90 TABLET | Refills: 3 | Status: SHIPPED | OUTPATIENT
Start: 2025-03-13 | End: 2026-03-13

## 2025-03-13 NOTE — PATIENT INSTRUCTIONS
Follow up after stress test    Increase crestor to 20mg once daily       DID YOU KNOW  We have a pharmacy here in the [facility].  They can fill all prescriptions, not just cardiac medications.  Prescriptions from other pharmacies can easily be transferred to the  pharmacy by the  pharmacist on site.   pharmacies offer FREE HOME DELIVERY on medications to anywhere in [place]. They can sync your medications. Typically prescriptions can be ready in 10 - 15 minutes. If pharmacy is unable to fill your  prescription or if cost is more than your paying now the Pharmacist can easily transfer back to your Pharmacy of choice. Pharmacy phone # [phone].     Please bring all medicines, vitamins, and herbal supplements with you in original bottles to every appointment  Prescriptions will not be filled unless you are compliant with your follow up appointments or have a follow up appointment scheduled as per instruction of your physician. Refills should be requested at the time of your visit.

## 2025-03-13 NOTE — PROGRESS NOTES
"    Chief Complaint:    Chief Complaint   Patient presents with    New Patient Visit     Referred by PCP - recent CT scoring done.  Here to establish care.      Patient is being seen in cardiology consultation at the request of Dr. William regarding multiple cardiac risk factors and coronary calcium score.  I have seen her several years ago  Subjective :   No established CAD  Cardiac risk factors include postmenopausal status mixed hyperlipidemia and primary hypertension  Interval review of systems is negative for chest discomfort pressure tightness heaviness palpitations lightheadedness orthopnea paroxysmal nocturnal dyspnea dependent edema or claudication TIA or CVA type symptoms or bleeding diathesis    Does get occasional exertional shortness of breath with unaccustomed activity.    History so Far :    1.  Primary hypertension  2.  Occasional cigarette smoking  3.  Encounter for alcohol intoxication without complication [facility] January 2025  4.  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  5.  Abnormal EKG, LVH pattern of anterior septal myocardial infarction left atrial enlargement  6.  Mixed hyperlipidemia  7.  Coronary calcium score February 2025-left main 0 LAD 25 left circumflex 46 RCA 5401 21 ascending asymmetric's thoracic aorta 3.3 cm in diameter MICHAELS 95th percentile for age gender and race  8.  Asymmetric septal hypertrophy echocardiogram 2020 without dynamic LVOT obstruction  Objective   Wt Readings from Last 3 Encounters:   03/13/25 105 kg (230 lb 12.8 oz)   11/15/24 108 kg (239 lb)   10/16/24 108 kg (239 lb)        Vitals:    03/13/25 1510   BP: 130/84   BP Location: Left arm   Patient Position: Sitting   Pulse: 65   Weight: 105 kg (230 lb 12.8 oz)   Height: 1.626 m (5' 4\")           Physical " "Exam:    GENERAL APPEARANCE: in no acute distress.  CHEST: Symmetric and non-tender.  INTEGUMENT: Skin warm and dry  HEENT: No gross abnormalities identified.No pallor or scleral icterus.  NECK: Supple, no JVD, no bruit.   NEURO/PSHCY: Alert and oriented x3; appropriate behavior and responses and responses  LUNGS: Clear to auscultation bilaterally; normal respiratory effort.  HEART: Rate and rhythm regular with no evident murmur; no gallop appreciated.   ABDOMEN: Soft, non tender.  MUSCULOSKELETAL: No gross deformities.  EXTREMITIES: Warm  There is no edema noted.    Meds:  Current Outpatient Medications   Medication Instructions    amLODIPine (NORVASC) 2.5 mg, oral, Daily    rosuvastatin (CRESTOR) 20 mg, oral, Daily    sertraline (ZOLOFT) 50 mg, oral, Daily    zolpidem (AMBIEN) 5 mg, oral, Nightly PRN          No Known Allergies    Testing Reviewed    CBC:   Lab Results   Component Value Date    WBC 6.3 09/24/2024    RBC 4.77 09/24/2024    HGB 14.0 09/24/2024    HCT 44.1 09/24/2024     09/24/2024        CMP:    Lab Results   Component Value Date     09/24/2024    K 4.0 09/24/2024     09/24/2024    CO2 28 09/24/2024    BUN 20 09/24/2024    CREATININE 0.73 09/24/2024    GLUCOSE 102 (H) 09/24/2024    CALCIUM 9.7 09/24/2024       Lipid Profile:    Lab Results   Component Value Date    TRIG 96 10/16/2024    HDL 67.6 10/16/2024    LDLCALC 152 (H) 10/16/2024       Magnesium:    Lab Results   Component Value Date    MG 1.80 10/11/2020       TSH:    Lab Results   Component Value Date    TSH 1.21 10/16/2024       BNP:   No results found for: \"BNP\"     PT/INR:    Lab Results   Component Value Date    PROTIME 11.2 03/28/2022    INR 1.0 03/28/2022     Reviewed all available pertinent laboratory data and diagnostic testing results that occurred after the last office visit with me                Assessment:    1. Elevated coronary artery calcium score  Referral to Cardiology    ECG 12 lead (Clinic Performed)    " Follow Up In Cardiology    Stress Test      2. BMI 39.0-39.9,adult  Follow Up In Cardiology      3. Primary hypertension  Follow Up In Cardiology    Stress Test      4. Abnormal EKG  Follow Up In Cardiology    Stress Test      5. Mixed hyperlipidemia  Follow Up In Cardiology    rosuvastatin (Crestor) 20 mg tablet           Clinical Decision Making:    Multiple cardiac risk factors  Coronary calcium score less than 100 but still 95th percentile for age and gender  Mixed hyperlipidemia  Abnormal EKG-pattern of anterior septal myocardial infarction.  Asymmetric septal hypertrophy on echocardiogram from 2020, no dynamic LVOT obstruction reported.  Extensive discussion about findings of coronary calcium score, the importance of risk factor modification.  Discussed the fact that coronary artery disease is a dynamic process, and then if new symptoms develop she should seek prompt medical attention.  At that point she says she does have some exertional shortness of breath which is not particularly worrisome, and she also thinks she might have sleep apnea.  Deferred diagnosis and management of sleep apnea to Dr. William  I will order a treadmill stress test without perfusion  I recommend an increase in rosuvastatin from 5 mg daily to 20 mg daily  Fasting liver and lipid profile 3 months after up titration of rosuvastatin  Follow-up after testing    Thank you Dr. William for allowing me to participate in [facility] care, please do not hesitate to call if further questions arise,  Sincerely,  Francine Abel MD [facility]    Follow up : after testing     I, Dr. Francine Abel MD, FACC, personally performed the services described in the documentation as scribed by Moira Ayala RN   in my presence, and confirm it is both accurate and complete.

## 2025-03-26 ENCOUNTER — ANCILLARY PROCEDURE (OUTPATIENT)
Dept: CARDIOLOGY | Facility: HOSPITAL | Age: 55
End: 2025-03-26
Payer: COMMERCIAL

## 2025-03-26 DIAGNOSIS — R93.1 ELEVATED CORONARY ARTERY CALCIUM SCORE: ICD-10-CM

## 2025-03-26 DIAGNOSIS — I10 PRIMARY HYPERTENSION: ICD-10-CM

## 2025-03-26 DIAGNOSIS — R94.31 ABNORMAL EKG: ICD-10-CM

## 2025-03-26 PROCEDURE — 93016 CV STRESS TEST SUPVJ ONLY: CPT | Performed by: INTERNAL MEDICINE

## 2025-03-26 PROCEDURE — 93018 CV STRESS TEST I&R ONLY: CPT | Performed by: INTERNAL MEDICINE

## 2025-03-26 PROCEDURE — 93017 CV STRESS TEST TRACING ONLY: CPT

## 2025-03-27 NOTE — RESULT ENCOUNTER NOTE
Dr. Francine Abel MD, FACC reviewed your stress test and advises that she will discuss results in detail during your upcoming office visit.  Looks good.

## 2025-04-08 ENCOUNTER — APPOINTMENT (OUTPATIENT)
Dept: CARDIOLOGY | Facility: CLINIC | Age: 55
End: 2025-04-08
Payer: COMMERCIAL

## 2025-04-08 VITALS
BODY MASS INDEX: 39.37 KG/M2 | WEIGHT: 230.6 LBS | SYSTOLIC BLOOD PRESSURE: 138 MMHG | DIASTOLIC BLOOD PRESSURE: 98 MMHG | HEART RATE: 74 BPM | HEIGHT: 64 IN

## 2025-04-08 DIAGNOSIS — Z71.2 ENCOUNTER TO DISCUSS TEST RESULTS: Primary | ICD-10-CM

## 2025-04-08 DIAGNOSIS — E78.2 MIXED HYPERLIPIDEMIA: ICD-10-CM

## 2025-04-08 DIAGNOSIS — R94.31 ABNORMAL EKG: ICD-10-CM

## 2025-04-08 DIAGNOSIS — R93.1 AGATSTON CORONARY ARTERY CALCIUM SCORE BETWEEN 100 AND 199: ICD-10-CM

## 2025-04-08 DIAGNOSIS — Z79.899 MEDICATION COURSE CHANGED: ICD-10-CM

## 2025-04-08 DIAGNOSIS — I10 PRIMARY HYPERTENSION: ICD-10-CM

## 2025-04-08 PROCEDURE — 3008F BODY MASS INDEX DOCD: CPT | Performed by: INTERNAL MEDICINE

## 2025-04-08 PROCEDURE — 3079F DIAST BP 80-89 MM HG: CPT | Performed by: INTERNAL MEDICINE

## 2025-04-08 PROCEDURE — 3075F SYST BP GE 130 - 139MM HG: CPT | Performed by: INTERNAL MEDICINE

## 2025-04-08 PROCEDURE — 99214 OFFICE O/P EST MOD 30 MIN: CPT | Performed by: INTERNAL MEDICINE

## 2025-04-08 RX ORDER — AMLODIPINE BESYLATE 5 MG/1
5 TABLET ORAL DAILY
Qty: 90 TABLET | Refills: 1 | Status: SHIPPED | OUTPATIENT
Start: 2025-04-08

## 2025-04-08 NOTE — PATIENT INSTRUCTIONS
Please bring all medicines, vitamins, and herbal supplements with you in original bottles to every appointment!!!!    Prescriptions will not be filled unless you are compliant with your follow up appointments or have a follow up appointment scheduled as per instruction of your physician. Refills should be requested at the time of your visit.     FASTING LABS, FASTING FROM MIDNIGHT THE NIGHT BEFORE TO BE DONE 1 WEEK PRIOR TO YOUR APPOINTMENT    INCREASE: AMLODIPINE 5 MG- TAKE 1 TABLET DAILY

## 2025-04-08 NOTE — PROGRESS NOTES
"  Most recently seen March 2025 underwent treadmill stress test presents for follow-up  Subjective :     Review of Systems interval review of systems is negative for chest discomfort pressure tightness heaviness palpitations lightheadedness orthopnea paroxysmal nocturnal dyspnea dependent edema or claudication TIA or CVA type symptoms or bleeding diathesis ,will get short of breath with with unaccustomed physical activity    History so Far :  1.  Primary hypertension  2.  Occasional cigarette smoking  3.  Encounter for alcohol intoxication without complication [facility] January 2025  4.  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  5.  Abnormal EKG, LVH pattern of anterior septal myocardial infarction left atrial enlargement  6.  Mixed hyperlipidemia  7.  Coronary calcium score February 2025-left main 0 LAD 25 left circumflex 46 RCA 50, total score 121 ascending thoracic aorta 3.3 cm in diameter MICHAELS 95th percentile for age gender and race  8.  Asymmetric septal hypertrophy echocardiogram 2020 without dynamic LVOT obstruction  9.  Treadmill stress test March 2025-stage II Jose Luis protocol 7 METS 95% age-predicted maximum heart rate average functional capacity no cardiac symptoms hypertensive response to exercise blood pressure peaked at 204/110 no EKG abnormalities with exercise, no symptoms of angina pectoris        Objective   Wt Readings from Last 3 Encounters:   04/08/25 105 kg (230 lb 9.6 oz)   03/13/25 105 kg (230 lb 12.8 oz)   11/15/24 108 kg (239 lb)        Vitals:    04/08/25 1441 04/08/25 1522   BP: 144/88 (!) 138/98   BP Location: Left arm Left arm   Patient Position: Sitting Sitting   Pulse: 74    Weight: 105 kg (230 lb 9.6 oz)    Height: 1.626 m (5' 4\")            Physical Exam:    GENERAL APPEARANCE: in " no acute distress.  CHEST: Symmetric and non-tender.  INTEGUMENT: Skin warm and dry  HEENT: No gross abnormalities identified.No pallor or scleral icterus.  NECK: Supple, no JVD, no bruit.   NEURO/PSHCY: Alert and oriented x3; appropriate behavior and responses and responses  LUNGS: Clear to auscultation bilaterally; normal respiratory effort.  HEART: Rate and rhythm regular with no evident murmur; no gallop appreciated.   ABDOMEN: Soft, non tender.  MUSCULOSKELETAL: No gross deformities.  EXTREMITIES: Warm  There is no edema noted.    Meds:  Current Outpatient Medications   Medication Instructions    amLODIPine (NORVASC) 5 mg, oral, Daily    rosuvastatin (CRESTOR) 20 mg, oral, Daily    sertraline (ZOLOFT) 50 mg, oral, Daily    zolpidem (AMBIEN) 5 mg, oral, Nightly PRN          No Known Allergies      LABS:      Testing Reviewed  Labs  CBC:   Lab Results   Component Value Date    WBC 6.3 09/24/2024    RBC 4.77 09/24/2024    HGB 14.0 09/24/2024    HCT 44.1 09/24/2024     09/24/2024        CMP:    Lab Results   Component Value Date     09/24/2024    K 4.0 09/24/2024     09/24/2024    CO2 28 09/24/2024    BUN 20 09/24/2024    CREATININE 0.73 09/24/2024    GLUCOSE 102 (H) 09/24/2024    CALCIUM 9.7 09/24/2024       Lipid Profile:    Lab Results   Component Value Date    TRIG 96 10/16/2024    HDL 67.6 10/16/2024    LDLCALC 152 (H) 10/16/2024       HgBA1c:    Lab Results   Component Value Date    HGBA1C 5.3 10/16/2024       Magnesium:    Lab Results   Component Value Date    MG 1.80 10/11/2020       FREE T4:    Lab Results   Component Value Date    FREET4 1.02 10/16/2024       TSH:    Lab Results   Component Value Date    TSH 1.21 10/16/2024         Reviewed all available pertinent laboratory data and diagnostic testing results that occurred after the last office visit with me                Assessment:    1. Encounter to discuss test results  Follow Up In Cardiology      2. Primary hypertension  Follow  Up In Cardiology    Comprehensive metabolic panel    Lipid panel    amLODIPine (Norvasc) 5 mg tablet    Comprehensive metabolic panel    Lipid panel      3. Mixed hyperlipidemia  Follow Up In Cardiology    Comprehensive metabolic panel    Lipid panel    Comprehensive metabolic panel    Lipid panel      4. Medication course changed  Follow Up In Cardiology      5. Agatston coronary artery calcium score between 100 and 199  Follow Up In Cardiology      6. BMI 39.0-39.9,adult  Follow Up In Cardiology      7. Abnormal EKG  Follow Up In Cardiology           Clinical Decision Making:  Patient wants to take a proactive role in her cardiac health.  Results of coronary calcium score was less than 100 total score but 75th percentile for age and gender  She is on appropriate risk factor modification.  Her blood pressure is above target today, she also had hypertensive response to exercise.  Will increase amlodipine to 5 mg p.o. daily  Discussed the dynamic nature of coronary artery disease and the importance of seeking prompt medical attention if symptoms develop  Discussed weight loss-consider GLP-1 agent?  Defer to Dr. William  Patient to keep track of her blood pressures at home, and to inform me or Dr. Ruiz  if systolic blood pressures are consistently at or above 140 mm mercury   Follow up : 6 months    Comprehensive profile and lipid profile prior to next visit                I,Malorie Watson MA   am scribing for, and in the presence of Dr. Francine Abel MD, FACC.       I, Dr. Francine Abel MD, FACC, personally performed the services described in the documentation as scribed by Malorie Watson MA   in my presence, and confirm it is both accurate and complete.

## 2025-04-22 ENCOUNTER — APPOINTMENT (OUTPATIENT)
Dept: PRIMARY CARE | Facility: CLINIC | Age: 55
End: 2025-04-22
Payer: COMMERCIAL

## 2025-04-23 ENCOUNTER — OFFICE VISIT (OUTPATIENT)
Dept: PRIMARY CARE | Facility: CLINIC | Age: 55
End: 2025-04-23
Payer: COMMERCIAL

## 2025-04-23 VITALS
HEART RATE: 90 BPM | DIASTOLIC BLOOD PRESSURE: 79 MMHG | BODY MASS INDEX: 38.76 KG/M2 | HEIGHT: 64 IN | TEMPERATURE: 97.3 F | SYSTOLIC BLOOD PRESSURE: 138 MMHG | OXYGEN SATURATION: 97 % | RESPIRATION RATE: 16 BRPM | WEIGHT: 227 LBS

## 2025-04-23 DIAGNOSIS — E78.2 MIXED HYPERLIPIDEMIA: ICD-10-CM

## 2025-04-23 DIAGNOSIS — Z00.00 ANNUAL PHYSICAL EXAM: ICD-10-CM

## 2025-04-23 DIAGNOSIS — F17.210 CIGARETTE NICOTINE DEPENDENCE WITHOUT COMPLICATION: ICD-10-CM

## 2025-04-23 DIAGNOSIS — C18.7 MALIGNANT NEOPLASM OF SIGMOID COLON (MULTI): ICD-10-CM

## 2025-04-23 DIAGNOSIS — R73.9 HYPERGLYCEMIA: ICD-10-CM

## 2025-04-23 DIAGNOSIS — R92.8 ABNORMAL MAMMOGRAM OF LEFT BREAST: ICD-10-CM

## 2025-04-23 DIAGNOSIS — F41.9 ANXIETY DISORDER, UNSPECIFIED TYPE: ICD-10-CM

## 2025-04-23 DIAGNOSIS — K51.40 PSEUDOPOLYPOSIS OF COLON WITHOUT COMPLICATION, UNSPECIFIED PART OF COLON (MULTI): Primary | ICD-10-CM

## 2025-04-23 DIAGNOSIS — I10 PRIMARY HYPERTENSION: ICD-10-CM

## 2025-04-23 DIAGNOSIS — F51.01 PRIMARY INSOMNIA: ICD-10-CM

## 2025-04-23 PROCEDURE — 99396 PREV VISIT EST AGE 40-64: CPT | Performed by: FAMILY MEDICINE

## 2025-04-23 PROCEDURE — 3078F DIAST BP <80 MM HG: CPT | Performed by: FAMILY MEDICINE

## 2025-04-23 PROCEDURE — 3008F BODY MASS INDEX DOCD: CPT | Performed by: FAMILY MEDICINE

## 2025-04-23 PROCEDURE — 4004F PT TOBACCO SCREEN RCVD TLK: CPT | Performed by: FAMILY MEDICINE

## 2025-04-23 PROCEDURE — 3075F SYST BP GE 130 - 139MM HG: CPT | Performed by: FAMILY MEDICINE

## 2025-04-23 RX ORDER — AMLODIPINE BESYLATE 5 MG/1
5 TABLET ORAL DAILY
Qty: 90 TABLET | Refills: 1 | Status: SHIPPED | OUTPATIENT
Start: 2025-04-23

## 2025-04-23 ASSESSMENT — PATIENT HEALTH QUESTIONNAIRE - PHQ9
2. FEELING DOWN, DEPRESSED OR HOPELESS: NOT AT ALL
1. LITTLE INTEREST OR PLEASURE IN DOING THINGS: NOT AT ALL
SUM OF ALL RESPONSES TO PHQ9 QUESTIONS 1 AND 2: 0

## 2025-04-23 NOTE — PROGRESS NOTES
"Subjective   Patient ID: Shruti Zurita is a 55 y.o. female who presents for   Chief Complaint   Patient presents with    Hypertension    Hyperlipidemia    Anxiety   Declines vaccines     CRC: due 11/2025-hx [place]  Mamm: 10/25/24-needs diagnostic left-pt states already done  Pap: 2/2020-agrees next OV  Lmp: n/a  Ldl 152    HPI  Problem List[1]    Surgical History[2]    Review of Systems  This patient has  NO history of seizures/ CAD or CVA    NO history of recent Covid nor flu symptoms,    NO Fever nor chills today,    NO Chest pain, shortness of breath nor paroxysmal nocturnal dyspnea,  NO Nausea, vomiting, nor diarrhea,  NO Hematochezia nor melena,  NO Dysuria, hematuria, nor new incontinence issues  NO new severe headaches nor neurological complaints,  NO new issues with anxiety nor depression nor new psychiatric complaints,  NO suicidal nor homicidal ideations.   Sees cardio dr dumont    OBJECTIVE:  /79   Pulse 90   Temp 36.3 °C (97.3 °F) (Temporal)   Resp 16   Ht 1.626 m (5' 4\")   Wt 103 kg (227 lb)   SpO2 97%   BMI 38.96 kg/m²      General:  alert, oriented, no acute distress.  No obvious skin rashes noted.   No gait disturbance noted/baseline gait.    Mood is pleasant,  no signs of emotional distress.   Not appearing intoxicated or altered.   No voiced delusions,   Normal, appropriate behavior.    HEENT: Normocephalic, atraumatic,   Pupils round, reactive to light  Extraocular motions intact and wnl  Tympanic membranes normal    Neck: no nuchal rigidity  No masses palpable.  No carotid bruits.  No thyromegaly.    Respiratory: Equal breath sounds  No wheezes,    rales,    nor rhonchi  No respiratory distress.    Heart: Regular rate and rhythm, no    murmurs  no rubs/gallops    Abdomen: no masses palpable, nontender, no rebound nor guarding.  ovwt  Extremities: NO cyanosis noted, no clubbing.   No edema noted.  2+dorsalis pedis pulses.    Normal-not antalgic, steady gait.    No visits with results " "within 3 Month(s) from this visit.   Latest known visit with results is:   Hospital Outpatient Visit on 11/15/2024   Component Date Value Ref Range Status    Case Report 11/15/2024    Final                    Value:Surgical Pathology                                Case: [id number]                                  Authorizing Provider:  Kal De Paz MD          Collected:           11/15/2024 0842              Ordering Location:     [facility] Received:            11/16/2024 0056              Pathologist:           Rossi Stewart MD                                                             Specimens:   A) - ASCENDING COLON POLYP, Ascending colon polyp x1                                                B) - COLON -CECUM POLYP, Cecal polyp x1                                                    FINAL DIAGNOSIS 11/15/2024    Final                    Value:A.  Ascending colon polyp x 1:  Inflammatory polyp.    B.  Cecal polyp x 1:  Tubular adenoma.        11/15/2024    Final                    Value:By the signature on this report, the individual or group listed as making the Final Interpretation/Diagnosis certifies that they have reviewed this case.       Clinical History 11/15/2024    Final                    Value:Malignant neoplasm of sigmoid colon (Multi) [C18.7]      Gross Description 11/15/2024    Final                    Value:A: Received in formalin, labeled with the patient's name and hospital number and \"1, ascending colon polyp x 1\", is a fragment of tan, soft tissue measuring 0.9 x 0.7 x 0.5 cm. The specimen is submitted in toto in one cassette.  SMS  B: Received in formalin, labeled with the patient's name and hospital number and \"tan, cecal polyp x 1\", are multiple fragments of tan, soft tissue aggregating to 1.0 x 0.6 x 0.2 cm. The specimen is submitted in toto in one cassette.  SMS          Assessment/Plan     Problem List Items Addressed This Visit       Anxiety disorder    Relevant " Orders    CBC and Auto Differential    Comprehensive Metabolic Panel    Hemoglobin A1C    Lipid Panel    Primary hypertension    Relevant Medications    amLODIPine (Norvasc) 5 mg tablet    Other Relevant Orders    CBC and Auto Differential    Comprehensive Metabolic Panel    Hemoglobin A1C    Lipid Panel    Hyperglycemia    Relevant Orders    CBC and Auto Differential    Comprehensive Metabolic Panel    Hemoglobin A1C    Lipid Panel    Malignant neoplasm of sigmoid colon (Multi)    Relevant Orders    CBC and Auto Differential    Comprehensive Metabolic Panel    Hemoglobin A1C    Lipid Panel    Mixed hyperlipidemia    Relevant Orders    CBC and Auto Differential    Comprehensive Metabolic Panel    Hemoglobin A1C    Lipid Panel    Pseudopolyposis of colon without complication, unspecified part of colon (Multi) - Primary    Relevant Orders    CBC and Auto Differential    Comprehensive Metabolic Panel    Hemoglobin A1C    Lipid Panel     Other Visit Diagnoses         Annual physical exam        Relevant Orders    CBC and Auto Differential    Comprehensive Metabolic Panel    Hemoglobin A1C    Lipid Panel      Abnormal mammogram of left breast        Relevant Orders    CBC and Auto Differential    Comprehensive Metabolic Panel    Hemoglobin A1C    Lipid Panel      Primary insomnia        Relevant Orders    Drug Screen, Urine With Reflex to Confirmation    CBC and Auto Differential    Comprehensive Metabolic Panel    Hemoglobin A1C    Lipid Panel      Cigarette nicotine dependence without complication        Relevant Orders    CT lung screening low dose    CBC and Auto Differential    Comprehensive Metabolic Panel    Hemoglobin A1C    Lipid Panel          Will search for films from mammogram  Declines diagnostic again since  we cannot find  1/2-1 cig/day would like ct lung though  Taking norvasc 2.5 x2/d  Will mychart msg bp in 2wks and we will adjust meds from there(likely up to 10mg if subopt)  Shruti Zurita -be aware  that any referrals discussed should be placed today or tests/labs ordered should result in prompt scheduling today.   If not done today-then a phone call for scheduling is expected in a timely manner(within 2 weeks).   If testing is to be done-a result should be available to patient within 2 weeks time unless otherwise specified.   You, the patient or caregiver, are responsible for making sure what was discussed is actually scheduled and completed.  If suboptimal understanding of results of tests or referral reason-a follow up appointment with me should be made.  If above does NOT occur-you are to connect with us for an explanation.    Follow up at next scheduled visit -as planned or directed today.  Sooner if new or unresolved issues of concern.    Shruti Zurita We know you have a choice for your health care, THANK YOU for choosing  and [facility].  We APPRECIATE YOU.  Sincerely,   Jo William MD   (dr. Estrada)             [1]   Patient Active Problem List  Diagnosis    Abnormal CT of the abdomen    Abnormal EKG    Anxiety disorder    Elevated liver enzymes    Primary hypertension    Hyperglycemia    Labile mood    Leg skin lesion, left    Malignant neoplasm of sigmoid colon (Multi)    Tachycardia    Class 3 severe obesity with body mass index (BMI) of 40.0 to 44.9 in adult    Elevated coronary artery calcium score    BMI 39.0-39.9,adult    Mixed hyperlipidemia    Encounter to discuss test results    Medication course changed    Pseudopolyposis of colon without complication, unspecified part of colon (Multi)   [2]   Past Surgical History:  Procedure Laterality Date     SECTION, CLASSIC      ,     COLON SURGERY      [place]    COLONOSCOPY W/ POLYPECTOMY      INVASIVE, MODERATELY DIFFERENTIATED ADENOCARCINOMA (sigmoid)-repeat     COLONOSCOPY W/ POLYPECTOMY  2024    adenoma--due 2025    FLEXIBLE SIGMOIDOSCOPY  2021    wnl

## 2025-04-23 NOTE — PROGRESS NOTES
Declines vaccines    CRC: due 11/2025-hx CA  Mamm: 10/25/24-needs diagnostic left-pt states already done  Pap: 2/2020-agrees next OV  Lmp: n/a

## 2025-04-24 LAB
ALBUMIN SERPL-MCNC: 4.1 G/DL (ref 3.6–5.1)
ALP SERPL-CCNC: 112 U/L (ref 37–153)
ALT SERPL-CCNC: 31 U/L (ref 6–29)
ANION GAP SERPL CALCULATED.4IONS-SCNC: 10 MMOL/L (CALC) (ref 7–17)
AST SERPL-CCNC: 57 U/L (ref 10–35)
BASOPHILS # BLD AUTO: 58 CELLS/UL (ref 0–200)
BASOPHILS NFR BLD AUTO: 0.8 %
BILIRUB SERPL-MCNC: 0.4 MG/DL (ref 0.2–1.2)
BUN SERPL-MCNC: 15 MG/DL (ref 7–25)
CALCIUM SERPL-MCNC: 9.3 MG/DL (ref 8.6–10.4)
CHLORIDE SERPL-SCNC: 105 MMOL/L (ref 98–110)
CHOLEST SERPL-MCNC: 212 MG/DL
CHOLEST/HDLC SERPL: 2.8 (CALC)
CO2 SERPL-SCNC: 27 MMOL/L (ref 20–32)
CREAT SERPL-MCNC: 0.78 MG/DL (ref 0.5–1.03)
EGFRCR SERPLBLD CKD-EPI 2021: 89 ML/MIN/1.73M2
EOSINOPHIL # BLD AUTO: 130 CELLS/UL (ref 15–500)
EOSINOPHIL NFR BLD AUTO: 1.8 %
ERYTHROCYTE [DISTWIDTH] IN BLOOD BY AUTOMATED COUNT: 13.4 % (ref 11–15)
EST. AVERAGE GLUCOSE BLD GHB EST-MCNC: 108 MG/DL
EST. AVERAGE GLUCOSE BLD GHB EST-SCNC: 6 MMOL/L
GLUCOSE SERPL-MCNC: 90 MG/DL (ref 65–99)
HBA1C MFR BLD: 5.4 %
HCT VFR BLD AUTO: 39.8 % (ref 35–45)
HDLC SERPL-MCNC: 75 MG/DL
HGB BLD-MCNC: 13.1 G/DL (ref 11.7–15.5)
LDLC SERPL CALC-MCNC: 118 MG/DL (CALC)
LYMPHOCYTES # BLD AUTO: 1858 CELLS/UL (ref 850–3900)
LYMPHOCYTES NFR BLD AUTO: 25.8 %
MCH RBC QN AUTO: 29.7 PG (ref 27–33)
MCHC RBC AUTO-ENTMCNC: 32.9 G/DL (ref 32–36)
MCV RBC AUTO: 90.2 FL (ref 80–100)
MEV IGG SER IA-ACNC: 183 AU/ML
MONOCYTES # BLD AUTO: 533 CELLS/UL (ref 200–950)
MONOCYTES NFR BLD AUTO: 7.4 %
NEUTROPHILS # BLD AUTO: 4622 CELLS/UL (ref 1500–7800)
NEUTROPHILS NFR BLD AUTO: 64.2 %
NONHDLC SERPL-MCNC: 137 MG/DL (CALC)
PLATELET # BLD AUTO: 301 THOUSAND/UL (ref 140–400)
PMV BLD REES-ECKER: 10.5 FL (ref 7.5–12.5)
POTASSIUM SERPL-SCNC: 4.5 MMOL/L (ref 3.5–5.3)
PROT SERPL-MCNC: 7.8 G/DL (ref 6.1–8.1)
RBC # BLD AUTO: 4.41 MILLION/UL (ref 3.8–5.1)
SODIUM SERPL-SCNC: 142 MMOL/L (ref 135–146)
TRIGL SERPL-MCNC: 93 MG/DL
WBC # BLD AUTO: 7.2 THOUSAND/UL (ref 3.8–10.8)

## 2025-04-28 DIAGNOSIS — R79.89 ELEVATED LFTS: ICD-10-CM

## 2025-05-08 ENCOUNTER — APPOINTMENT (OUTPATIENT)
Facility: HOSPITAL | Age: 55
End: 2025-05-08
Payer: COMMERCIAL

## 2025-05-28 ENCOUNTER — HOSPITAL ENCOUNTER (OUTPATIENT)
Dept: RADIOLOGY | Facility: HOSPITAL | Age: 55
Discharge: HOME | End: 2025-05-28
Payer: COMMERCIAL

## 2025-05-28 DIAGNOSIS — R79.89 ELEVATED LFTS: ICD-10-CM

## 2025-05-28 DIAGNOSIS — R92.8 ABNORMAL MAMMOGRAM OF LEFT BREAST: ICD-10-CM

## 2025-05-28 PROCEDURE — 76982 USE 1ST TARGET LESION: CPT | Mod: LT

## 2025-05-28 PROCEDURE — 76705 ECHO EXAM OF ABDOMEN: CPT | Performed by: RADIOLOGY

## 2025-05-28 PROCEDURE — 76642 ULTRASOUND BREAST LIMITED: CPT | Mod: LEFT SIDE | Performed by: RADIOLOGY

## 2025-05-28 PROCEDURE — 76705 ECHO EXAM OF ABDOMEN: CPT

## 2025-05-28 PROCEDURE — 77061 BREAST TOMOSYNTHESIS UNI: CPT | Mod: LT

## 2025-05-28 PROCEDURE — 77061 BREAST TOMOSYNTHESIS UNI: CPT | Mod: LEFT SIDE | Performed by: RADIOLOGY

## 2025-05-28 PROCEDURE — 77065 DX MAMMO INCL CAD UNI: CPT | Mod: LEFT SIDE | Performed by: RADIOLOGY

## 2025-05-28 PROCEDURE — 76642 ULTRASOUND BREAST LIMITED: CPT | Mod: LT

## 2025-06-02 DIAGNOSIS — R16.0 LIVER MASS: ICD-10-CM

## 2025-06-26 ENCOUNTER — APPOINTMENT (OUTPATIENT)
Dept: RADIOLOGY | Facility: HOSPITAL | Age: 55
End: 2025-06-26
Payer: COMMERCIAL

## 2025-06-26 DIAGNOSIS — R16.0 LIVER MASS: ICD-10-CM

## 2025-06-26 PROCEDURE — 2550000001 HC RX 255 CONTRASTS: Performed by: FAMILY MEDICINE

## 2025-06-26 PROCEDURE — A9575 INJ GADOTERATE MEGLUMI 0.1ML: HCPCS | Performed by: FAMILY MEDICINE

## 2025-06-26 PROCEDURE — 74183 MRI ABD W/O CNTR FLWD CNTR: CPT

## 2025-06-26 RX ORDER — GADOTERATE MEGLUMINE 376.9 MG/ML
0.2 INJECTION INTRAVENOUS
Status: COMPLETED | OUTPATIENT
Start: 2025-06-26 | End: 2025-06-26

## 2025-06-26 RX ADMIN — GADOTERATE MEGLUMINE 20 ML: 376.9 INJECTION INTRAVENOUS at 09:46

## 2025-07-01 DIAGNOSIS — R93.5 ABNORMAL MRI OF ABDOMEN: ICD-10-CM

## 2025-07-02 DIAGNOSIS — R87.9 ABNORMAL TISSUE IN THE UTERUS: Primary | ICD-10-CM

## 2025-07-28 DIAGNOSIS — R79.89 ELEVATED LFTS: ICD-10-CM

## 2025-09-25 ENCOUNTER — APPOINTMENT (OUTPATIENT)
Dept: CARDIOLOGY | Facility: CLINIC | Age: 55
End: 2025-09-25
Payer: COMMERCIAL

## 2025-10-08 ENCOUNTER — APPOINTMENT (OUTPATIENT)
Dept: CARDIOLOGY | Facility: CLINIC | Age: 55
End: 2025-10-08
Payer: COMMERCIAL

## 2025-10-24 ENCOUNTER — APPOINTMENT (OUTPATIENT)
Dept: PRIMARY CARE | Facility: CLINIC | Age: 55
End: 2025-10-24
Payer: COMMERCIAL